# Patient Record
Sex: MALE | Race: WHITE | NOT HISPANIC OR LATINO | Employment: FULL TIME | URBAN - METROPOLITAN AREA
[De-identification: names, ages, dates, MRNs, and addresses within clinical notes are randomized per-mention and may not be internally consistent; named-entity substitution may affect disease eponyms.]

---

## 2017-08-18 ENCOUNTER — OFFICE VISIT (OUTPATIENT)
Dept: INTERNAL MEDICINE | Facility: CLINIC | Age: 57
End: 2017-08-18

## 2017-08-18 VITALS
HEART RATE: 86 BPM | DIASTOLIC BLOOD PRESSURE: 86 MMHG | SYSTOLIC BLOOD PRESSURE: 142 MMHG | BODY MASS INDEX: 23.39 KG/M2 | WEIGHT: 163 LBS | TEMPERATURE: 97.2 F | OXYGEN SATURATION: 98 %

## 2017-08-18 DIAGNOSIS — G89.29 HEEL PAIN, CHRONIC, RIGHT: ICD-10-CM

## 2017-08-18 DIAGNOSIS — M79.671 HEEL PAIN, CHRONIC, RIGHT: ICD-10-CM

## 2017-08-18 DIAGNOSIS — Z00.00 HEALTH CARE MAINTENANCE: Primary | ICD-10-CM

## 2017-08-18 DIAGNOSIS — M25.512 BILATERAL SHOULDER PAIN, UNSPECIFIED CHRONICITY: ICD-10-CM

## 2017-08-18 DIAGNOSIS — M25.511 BILATERAL SHOULDER PAIN, UNSPECIFIED CHRONICITY: ICD-10-CM

## 2017-08-18 PROCEDURE — 99213 OFFICE O/P EST LOW 20 MIN: CPT | Performed by: FAMILY MEDICINE

## 2017-08-18 PROCEDURE — 99396 PREV VISIT EST AGE 40-64: CPT | Performed by: FAMILY MEDICINE

## 2017-08-18 RX ORDER — TADALAFIL 20 MG
TABLET ORAL
Refills: 11 | COMMUNITY
Start: 2017-07-09

## 2017-08-18 NOTE — PROGRESS NOTES
Subjective   Leroy Vega is a 57 y.o. male.     Chief Complaint   Patient presents with   • Annual Exam         History of Present Illness patient is a very healthy 57-year-old gentleman who travels a lot internationally.  Last year he had dengue fever recovered.  He is here for wellness exam.  He has followed up with his urologist last week and is stable as far as history of prostate cancer.  10 months ago he did some work in [facility] and has wound up with recurrent bilateral shoulder pain.  He also has developed pain at the Achilles insertion of the right heel after stepping awkwardly on a rock in [place] a few months ago.  These have altered his ability to do the exercises he wishes to do on a chronic basis which is mostly aerobics and by cycling    The following portions of the patient's history were reviewed and updated as appropriate: allergies, current medications, past social history and problem list.    Review of Systems   Constitutional: Negative.    HENT: Negative.    Eyes: Negative.    Respiratory: Negative.    Cardiovascular: Negative.    Gastrointestinal: Negative.    Endocrine: Negative.    Genitourinary: Negative.    Musculoskeletal: Positive for arthralgias and neck stiffness.   Skin: Negative.    Allergic/Immunologic: Negative.    Neurological: Negative.    Hematological: Negative.    Psychiatric/Behavioral: Negative.        Objective   Vitals:    08/18/17 1122   BP: 142/86   Pulse: 86   Temp: 97.2 °F (36.2 °C)   SpO2: 98%     Physical Exam   Constitutional: He is oriented to person, place, and time. He appears well-developed and well-nourished.   HENT:   Head: Normocephalic and atraumatic.   Right Ear: Tympanic membrane and external ear normal.   Left Ear: Tympanic membrane and external ear normal.   Nose: Nose normal.   Mouth/Throat: Oropharynx is clear and moist.   Eyes: Conjunctivae and EOM are normal. Pupils are equal, round, and reactive to light.   Neck: Normal range of  motion. Neck supple. No JVD present. No thyromegaly present.   Cardiovascular: Normal rate, regular rhythm, normal heart sounds and intact distal pulses.    Pulmonary/Chest: Effort normal and breath sounds normal.           Abdominal: Soft. Bowel sounds are normal.   Musculoskeletal: Normal range of motion.        Arms:       Lymphadenopathy:     He has no cervical adenopathy.   Neurological: He is alert and oriented to person, place, and time. No cranial nerve deficit. Coordination normal.   Skin: Skin is warm and dry. No rash noted.   Psychiatric: He has a normal mood and affect. His behavior is normal. Judgment and thought content normal.   Vitals reviewed.      Assessment/Plan   Problem List Items Addressed This Visit     None      Visit Diagnoses     Health care maintenance    -  Primary    Heel pain, chronic, right        Relevant Orders    Ambulatory Referral to Sports Medicine    Bilateral shoulder pain, unspecified chronicity        Relevant Orders    Ambulatory Referral to Sports Medicine      Plan: He is brought in labs are as insurance would look very good.  His labs look very stable.  His viral for any additional medication.  Like symptoms sports medicine get checked out as far as his shoulders and the right at Achilles tendon insertion.  Otherwise recheck in here sooner if needed.

## 2017-08-28 ENCOUNTER — OFFICE VISIT (OUTPATIENT)
Dept: SPORTS MEDICINE | Facility: CLINIC | Age: 57
End: 2017-08-28

## 2017-08-28 VITALS
BODY MASS INDEX: 23.05 KG/M2 | SYSTOLIC BLOOD PRESSURE: 124 MMHG | OXYGEN SATURATION: 98 % | HEIGHT: 70 IN | WEIGHT: 161 LBS | DIASTOLIC BLOOD PRESSURE: 84 MMHG | HEART RATE: 85 BPM

## 2017-08-28 DIAGNOSIS — M79.672 PAIN OF LEFT HEEL: ICD-10-CM

## 2017-08-28 DIAGNOSIS — S49.92XA SHOULDER INJURY, LEFT, INITIAL ENCOUNTER: Primary | ICD-10-CM

## 2017-08-28 PROCEDURE — 99244 OFF/OP CNSLTJ NEW/EST MOD 40: CPT | Performed by: FAMILY MEDICINE

## 2017-08-28 PROCEDURE — 73030 X-RAY EXAM OF SHOULDER: CPT | Performed by: FAMILY MEDICINE

## 2017-08-28 PROCEDURE — 73630 X-RAY EXAM OF FOOT: CPT | Performed by: FAMILY MEDICINE

## 2017-08-28 NOTE — PROGRESS NOTES
"Leroy is a 57 y.o. year old male    Chief Complaint   Patient presents with   • Left Shoulder - Pain       History of Present Illness  1. Left shoulder pain since last October. 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  He saw a physician in December last year was diagnosed with a \"overuse\" of the left shoulder, patient has been \"babying it\" since that time but is still having pain that can be anterior or posterior with the posterior pain became more significant.  Pain is worse with certain movements of the shoulder such as rolling the shoulder or going from external/internal rotation of the shoulder.  He denies any weakness.  No neck pain.  No paresthesias.  2. L heel pain since the latter part of July, while climbing up sand dune he felt a sudden onset of sharp pain in the posterior left heel as he points to the Achilles attachment.  The next day he noted some pain and swelling over the plantar surface of the heel but he has not had any plantar surface heel pain.  The discomfort has gotten better with changing shoe gear adding more he'll lift, \"I want to be sure I'm okay to go back to running\".  Again he has no plantar surface heel pain, first few steps in the morning are fine.    I have reviewed the patient's medical history in detail and updated the computerized patient record.    Review of Systems   Constitutional: Negative for fever.   Musculoskeletal:        Per history of present illness   Skin: Negative for wound.   Neurological: Negative for numbness.   All other systems reviewed and are negative.      /84  Pulse 85  Ht 70\" (177.8 cm)  Wt 161 lb (73 kg)  SpO2 98%  BMI 23.1 kg/m2     Physical Exam    Vital signs reviewed.   General: No acute distress.  Eyes: conjunctiva clear; pupils equally round and " reactive  ENT: external ears and nose atraumatic; oropharynx clear  CV: no peripheral edema, 2+ distal pulses  Resp: normal respiratory effort, no use of accessory muscles  Skin: no rashes or wounds; normal turgor  Psych: mood and affect appropriate; recent and remote memory intact  Neuro: sensation to light touch intact    MSK Exam:  1.  Left shoulder general appearance is normal.  Patient has full painless range of motion.  Rotator cuff motor 5 out of 5.  Negative Bernardo, equivocal Neer testing but the pain is posterior.  Mild sulcus, normal anterior-posterior glide.  Positive [place] with pain being posteriorly.  2.  Left heel normal in general appearance, there is a tight heel cord.  No tenderness to palpation along the Achilles or at its attachment.  Normal Achilles response to calf squeeze.  No pain with resisted range of motion.  Brief ultrasound exam reveals a normal-appearing Achilles in particular at the attachment.    Left Shoulder X-Ray  Indication: Pain  Views: AP Internal and External Rotation and Axillary    Findings:  No fracture  No bony lesion  Normal soft tissues  Mild changes of arthritis at the acromioclavicular joint.    No prior studies were available for comparison.  Left Foot X-Ray  Indication: Pain  AP, Lateral, and Oblique views    Findings:  No fracture  No bony lesion  Normal soft tissues  Mild arthritic changes at the first MTP.    No prior studies were available for comparison.    Diagnoses and all orders for this visit:    Shoulder injury, left, initial encounter  -     XR Shoulder 2+ View Left  -     Ambulatory Referral to Physical Therapy Evaluate and treat    Pain of left heel  -     XR Foot 3+ View Left  1.  Patient is possible the patient actually may have a posterior labral tear in this left shoulder.  We'll start with some formal physical therapy but if he is not seeing significant improvement over the next 4-5 weeks and would prefer to have MR arthrogram at that time.  2.   Left heel pain, essentially normal exam today, I do wonder if he may have had a small tear of the Achilles attachment on the lateral side at the time of his injury.  He is seen significant improvement since that time and for now we will recommend that he hold off on running for another 2-3 weeks, had a few millimeters of he'll lift, then start stretches and he may start attempting running at that time.  If he cannot get back to running by that time then return the office.    EMR Dragon/Transcription disclaimer:    Much of this encounter note is an electronic transcription/translation of spoken language to printed text.  The electronic translation of spoken language may permit erroneous, or at times, nonsensical words or phrases to be inadvertently transcribed.  Although I have reviewed the note for such errors some may still exist.

## 2017-09-18 ENCOUNTER — TREATMENT (OUTPATIENT)
Dept: PHYSICAL THERAPY | Facility: CLINIC | Age: 57
End: 2017-09-18

## 2017-09-18 DIAGNOSIS — S49.92XD SHOULDER INJURY, LEFT, SUBSEQUENT ENCOUNTER: Primary | ICD-10-CM

## 2017-09-18 PROCEDURE — 97035 APP MDLTY 1+ULTRASOUND EA 15: CPT | Performed by: PHYSICAL THERAPIST

## 2017-09-18 PROCEDURE — 97161 PT EVAL LOW COMPLEX 20 MIN: CPT | Performed by: PHYSICAL THERAPIST

## 2017-09-18 PROCEDURE — A9999 DME SUPPLY OR ACCESSORY, NOS: HCPCS | Performed by: PHYSICAL THERAPIST

## 2017-09-18 PROCEDURE — 97110 THERAPEUTIC EXERCISES: CPT | Performed by: PHYSICAL THERAPIST

## 2017-09-18 NOTE — PATIENT INSTRUCTIONS
Access Code: [email or web address]   URL: [email or web address]   Date: 09/18/2017   Prepared by: Barbara Olson     Exercises  Prone Lower Trapezius Strengthening on Swiss Ball - 10 reps - 2 sets - 5 hold - 1x daily  Prone Middle Trapezius Strengthening on Swiss Ball - 10 reps - 2 sets - 5 hold - 1x daily  Standing Wall Ball Circles in Scaption with Mini Swiss Ball - 30 reps - 2 sets - 1x daily  Standing Plank on Wall with Reaches and Resistance - 5 reps - 1 sets - 3 hold - 1x daily  Shoulder External Rotation and Scapular Retraction with Resistance - 15 reps - 1 sets - 5 hold - 1x daily  Push-Up on Counter - 20 reps - 1 sets - 3 hold - 1x daily    Issued red and yellow TB for HEP
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
## 2017-09-25 ENCOUNTER — TREATMENT (OUTPATIENT)
Dept: PHYSICAL THERAPY | Facility: CLINIC | Age: 57
End: 2017-09-25

## 2017-09-25 DIAGNOSIS — S49.92XD SHOULDER INJURY, LEFT, SUBSEQUENT ENCOUNTER: Primary | ICD-10-CM

## 2017-09-25 PROCEDURE — 97035 APP MDLTY 1+ULTRASOUND EA 15: CPT | Performed by: PHYSICAL THERAPIST

## 2017-09-25 PROCEDURE — 97110 THERAPEUTIC EXERCISES: CPT | Performed by: PHYSICAL THERAPIST

## 2017-09-25 PROCEDURE — 97112 NEUROMUSCULAR REEDUCATION: CPT | Performed by: PHYSICAL THERAPIST

## 2017-09-25 NOTE — PROGRESS NOTES
Physical Therapy Daily Progress Note    Visit # : 2  Leroy Vega reports: feeling about the same; noted pain raking the leaves over the weekend.  Cycled over the weekend which didn't seem to bother my shoulder.      Subjective     Objective   See Exercise, Manual, and Modality Logs for complete treatment.       Assessment/Plan  Pt noting tightness in L shoulder with SB push up.  Pt instructed to decrease ROM with TB exercise to allow for pain free reps.  Good tolerance to exercise progression.     Progress per Plan of Care           Manual Therapy:    3     mins  41534;  Therapeutic Exercise:    15     mins  85970;     Neuromuscular Vasquez:    15    mins  67494;    Therapeutic Activity:     -     mins  96791;     Gait Training:      -     mins  85795;     Ultrasound:     8     mins  10101;    Electrical Stimulation:    -     mins  97628 ( );  Dry Needling     -     mins self-pay      Timed Treatment:   38   mins   Total Treatment:    41    mins        Colleen Olson PT  Physical Therapist  [place] License # [id number]

## 2017-09-25 NOTE — PATIENT INSTRUCTIONS
Access Code: [email or web address]   URL: [email or web address]   Date: 09/25/2017   Prepared by: Barbara Olson     Exercises  Prone Lower Trapezius Strengthening on Swiss Ball - 10 reps - 2 sets - 5 hold - 1x daily  Prone Middle Trapezius Strengthening on Swiss Ball - 10 reps - 2 sets - 5 hold - 1x daily  Standing Wall Ball Circles in Scaption with Mini Swiss Ball - 30 reps - 2 sets - 1x daily  Standing Plank on Wall with Reaches and Resistance - 5 reps - 1 sets - 3 hold - 1x daily  Shoulder External Rotation and Scapular Retraction with Resistance - 15 reps - 1 sets - 5 hold - 1x daily  Push-Up on Counter - 20 reps - 1 sets - 3 hold - 1x daily  Shoulder External Rotation Reactive Isometrics - 10 reps - 1 sets - 3 hold - 1x daily

## 2017-10-02 ENCOUNTER — TREATMENT (OUTPATIENT)
Dept: PHYSICAL THERAPY | Facility: CLINIC | Age: 57
End: 2017-10-02

## 2017-10-02 DIAGNOSIS — S49.92XD SHOULDER INJURY, LEFT, SUBSEQUENT ENCOUNTER: Primary | ICD-10-CM

## 2017-10-02 PROCEDURE — 97112 NEUROMUSCULAR REEDUCATION: CPT | Performed by: PHYSICAL THERAPIST

## 2017-10-02 PROCEDURE — 97110 THERAPEUTIC EXERCISES: CPT | Performed by: PHYSICAL THERAPIST

## 2017-10-02 NOTE — PROGRESS NOTES
Physical Therapy Daily Progress Note    Visit # : 3  Leroy Vega reports: shoulder feels tight; certain movements remain painful.  Overall, ~ 20% improvement in symptoms.      Subjective     Objective     Tenderness     Left Shoulder   No tenderness in the infraspinatus tendon, subacromial bursa and supraspinatus tendon.     Tests     Left Shoulder   Positive active compression ([place]).      See Exercise, Manual, and Modality Logs for complete treatment.       Assessment/Plan  Pt noting pain with retro UBE and overall tightening in shoulder with body blade pro.  Pt may require further medical testing to r/o labral involvement.  Pt instructed to contact MD and will hold further PT at this time.  Awaiting MD orders           Manual Therapy:    3     mins  79663;  Therapeutic Exercise:    15     mins  85126;     Neuromuscular Vasquez:    15    mins  83247;    Therapeutic Activity:     -     mins  25710;     Gait Training:      -     mins  78709;     Ultrasound:     -     mins  03818;    Electrical Stimulation:    -     mins  04697 ( );  Dry Needling     -     mins self-pay      Timed Treatment:   33   mins   Total Treatment:     45   mins        Colleen Olson PT  Physical Therapist  [place] License # [id number]

## 2017-10-03 ENCOUNTER — TELEPHONE (OUTPATIENT)
Dept: SPORTS MEDICINE | Facility: CLINIC | Age: 57
End: 2017-10-03

## 2017-10-03 DIAGNOSIS — M24.812 INTERNAL DERANGEMENT OF SHOULDER, LEFT: Primary | ICD-10-CM

## 2017-10-03 NOTE — TELEPHONE ENCOUNTER
Patient is calling requesting MRI left shoulder. He has finished therapy and thinks that it may be a good idea to get the MRI because therapy hasn't really helped with the shoulder. Can you input MRI for him please.

## 2017-10-13 DIAGNOSIS — S43.432A LABRAL TEAR OF SHOULDER, LEFT, INITIAL ENCOUNTER: Primary | ICD-10-CM

## 2017-11-06 ENCOUNTER — DOCUMENTATION (OUTPATIENT)
Dept: PHYSICAL THERAPY | Facility: CLINIC | Age: 57
End: 2017-11-06

## 2017-11-06 NOTE — PROGRESS NOTES
Discharge Summary  Discharge Summary from Physical Therapy Report      Dates  PT visit: 9/18-10/2/17  Number of Visits: 3     Discharge Status of Patient: See progress note dated 10/2/17    Goals: Partially Met    Discharge Plan: Patient to return to referring/providing physician    Comments pt may require further medical testing to r/o labral involvement    Date of Discharge 11/6/17        Colleen lOson, PT  Physical Therapist

## 2017-11-30 ENCOUNTER — OFFICE VISIT (OUTPATIENT)
Dept: ORTHOPEDIC SURGERY | Facility: CLINIC | Age: 57
End: 2017-11-30

## 2017-11-30 VITALS
SYSTOLIC BLOOD PRESSURE: 114 MMHG | BODY MASS INDEX: 23.05 KG/M2 | HEIGHT: 70 IN | DIASTOLIC BLOOD PRESSURE: 76 MMHG | WEIGHT: 161 LBS | HEART RATE: 59 BPM

## 2017-11-30 DIAGNOSIS — M75.42 SUBACROMIAL IMPINGEMENT OF LEFT SHOULDER: ICD-10-CM

## 2017-11-30 DIAGNOSIS — S43.432A SUPERIOR GLENOID LABRUM LESION OF LEFT SHOULDER, INITIAL ENCOUNTER: Primary | ICD-10-CM

## 2017-11-30 PROCEDURE — 20610 DRAIN/INJ JOINT/BURSA W/O US: CPT | Performed by: ORTHOPAEDIC SURGERY

## 2017-11-30 PROCEDURE — 99204 OFFICE O/P NEW MOD 45 MIN: CPT | Performed by: ORTHOPAEDIC SURGERY

## 2017-11-30 RX ORDER — NAPROXEN SODIUM 220 MG
220 TABLET ORAL 2 TIMES DAILY PRN
COMMUNITY

## 2017-11-30 RX ORDER — PSEUDOEPHEDRINE HCL 30 MG
30 TABLET ORAL EVERY 4 HOURS PRN
COMMUNITY

## 2017-11-30 RX ADMIN — TRIAMCINOLONE ACETONIDE 80 MG: 40 INJECTION, SUSPENSION INTRA-ARTICULAR; INTRAMUSCULAR at 16:02

## 2017-11-30 RX ADMIN — LIDOCAINE HYDROCHLORIDE 4 ML: 10 INJECTION, SOLUTION EPIDURAL; INFILTRATION; INTRACAUDAL; PERINEURAL at 16:02

## 2017-12-04 PROBLEM — M75.42 SUBACROMIAL IMPINGEMENT OF LEFT SHOULDER: Status: ACTIVE | Noted: 2017-12-04

## 2017-12-04 PROBLEM — S43.432A SUPERIOR GLENOID LABRUM LESION OF LEFT SHOULDER: Status: ACTIVE | Noted: 2017-12-04

## 2017-12-04 RX ORDER — TRIAMCINOLONE ACETONIDE 40 MG/ML
80 INJECTION, SUSPENSION INTRA-ARTICULAR; INTRAMUSCULAR
Status: COMPLETED | OUTPATIENT
Start: 2017-11-30 | End: 2017-11-30

## 2017-12-04 RX ORDER — LIDOCAINE HYDROCHLORIDE 10 MG/ML
4 INJECTION, SOLUTION EPIDURAL; INFILTRATION; INTRACAUDAL; PERINEURAL
Status: COMPLETED | OUTPATIENT
Start: 2017-11-30 | End: 2017-11-30

## 2018-01-04 ENCOUNTER — OFFICE VISIT (OUTPATIENT)
Dept: ORTHOPEDIC SURGERY | Facility: CLINIC | Age: 58
End: 2018-01-04

## 2018-01-04 DIAGNOSIS — S43.432D SUPERIOR GLENOID LABRUM LESION OF LEFT SHOULDER, SUBSEQUENT ENCOUNTER: Primary | ICD-10-CM

## 2018-01-04 DIAGNOSIS — M75.42 SUBACROMIAL IMPINGEMENT OF LEFT SHOULDER: ICD-10-CM

## 2018-01-04 PROCEDURE — 99212 OFFICE O/P EST SF 10 MIN: CPT | Performed by: ORTHOPAEDIC SURGERY

## 2018-01-04 NOTE — PROGRESS NOTES
Subjective:     Patient ID: Leroy Vega is a 57 y.o. male.    Chief Complaint:  Follow-up left shoulder pain, SLAP lesion  History of Present Illness  Leroy Vega returns to clinic today for evaluation of left shoulder, states he is doing significantly better following subacromial injection with improvement activity levels and swimming, golfing, and lifting and pushing activities with the left shoulder.  Notes mild residual irritation with repetitive activities including bench press with significant shoulder extension, rates pain with these activities as a 3-4 out of 10, sharp in nature, localized to the posterior shoulder primarily, moderate improvement with rest and anti-inflammatory medications.  Notes radiation of pain, denies associated numbness or tingling.  Does still notes some occasional clicking and catching over his left shoulder.     Social History     Occupational History   • Not on file.     Social History Main Topics   • Smoking status: Never Smoker   • Smokeless tobacco: Not on file   • Alcohol use Yes      Comment: occ   • Drug use: Not on file   • Sexual activity: Not on file      No past medical history on file.  Past Surgical History:   Procedure Laterality Date   • COLONOSCOPY  05/30/2013   • KNEE SURGERY     • PROSTATE SURGERY         Family History   Problem Relation Age of Onset   • Cancer Mother    • Cancer Father          Review of Systems   Constitutional: Negative for chills, diaphoresis, fever and unexpected weight change.   HENT: Negative for hearing loss, nosebleeds, sore throat and tinnitus.    Eyes: Negative for pain and visual disturbance.   Respiratory: Negative for cough, shortness of breath and wheezing.    Cardiovascular: Negative for chest pain and palpitations.   Gastrointestinal: Negative for abdominal pain, diarrhea, nausea and vomiting.   Endocrine: Negative for cold intolerance, heat intolerance and polydipsia.   Genitourinary: Negative for difficulty urinating,  dysuria and hematuria.   Musculoskeletal: Negative for arthralgias, joint swelling and myalgias.   Skin: Negative for rash and wound.   Allergic/Immunologic: Negative for environmental allergies.   Neurological: Negative for dizziness, syncope and numbness.   Hematological: Does not bruise/bleed easily.   Psychiatric/Behavioral: Negative for dysphoric mood and sleep disturbance. The patient is not nervous/anxious.            Objective:  There were no vitals filed for this visit.  There were no vitals filed for this visit.  There is no height or weight on file to calculate BMI.  General: No acute distress.  Resp: normal respiratory effort  Skin: no rashes or wounds; normal turgor  Psych: mood and affect appropriate; recent and remote memory intact         Ortho Exam    Left shoulder-active forward flexion 175°, external rotation 60°, internal rotation T12, 5 out of 5 strength in all planes of motion.  Negative Bernardo and Neer's, negative drop arm test, negative empty can test, mild tenderness on [place]'s testing, minimal tenderness with Yergason and speed's, no tenderness on crossarm testing or over the acromioclavicular joint.  Mildly positive jerk test.  No posterior laxity noted.    Imaging:    Assessment:       1. Superior glenoid labrum lesion of left shoulder, subsequent encounter    2. Subacromial impingement of left shoulder          Plan:  ASHLEY query complete.          Discussed treatment options at length with patient at today's visit. Patient is very happy with the response from subacromial injection at this time, feels that he has been able to return to most of his activities with minimal associated difficulty.  He does have recurrence of pain we may discuss other options including not limited to labral debridement and biceps tenodesis versus SLAP repair.     Leroy Vega was in agreement with plan and had all questions answered.     Orders:  No orders of the defined types were placed in this  encounter.      Medications:  No orders of the defined types were placed in this encounter.      Followup:  Return if symptoms worsen or fail to improve.    Leroy was seen today for follow-up.    Diagnoses and all orders for this visit:    Superior glenoid labrum lesion of left shoulder, subsequent encounter    Subacromial impingement of left shoulder        Dragon transcription disclaimer     Much of this encounter note is an electronic transcription/translation of spoken language to printed text. The electronic translation of spoken language may permit erroneous, or at times, nonsensical words or phrases to be inadvertently transcribed. Although I have reviewed the note for such errors, some may still exist.

## 2018-07-16 ENCOUNTER — TELEPHONE (OUTPATIENT)
Dept: SPORTS MEDICINE | Facility: CLINIC | Age: 58
End: 2018-07-16

## 2018-08-10 ENCOUNTER — RESULTS ENCOUNTER (OUTPATIENT)
Dept: INTERNAL MEDICINE | Facility: CLINIC | Age: 58
End: 2018-08-10

## 2018-08-10 DIAGNOSIS — G89.29 HEEL PAIN, CHRONIC, RIGHT: ICD-10-CM

## 2018-08-10 DIAGNOSIS — Z00.00 HEALTH CARE MAINTENANCE: ICD-10-CM

## 2018-08-10 DIAGNOSIS — M79.671 HEEL PAIN, CHRONIC, RIGHT: ICD-10-CM

## 2018-08-10 DIAGNOSIS — M25.512 BILATERAL SHOULDER PAIN, UNSPECIFIED CHRONICITY: ICD-10-CM

## 2018-08-10 DIAGNOSIS — M25.511 BILATERAL SHOULDER PAIN, UNSPECIFIED CHRONICITY: ICD-10-CM

## 2018-08-20 ENCOUNTER — OFFICE VISIT (OUTPATIENT)
Dept: INTERNAL MEDICINE | Facility: CLINIC | Age: 58
End: 2018-08-20

## 2018-08-20 VITALS
SYSTOLIC BLOOD PRESSURE: 108 MMHG | BODY MASS INDEX: 22.53 KG/M2 | DIASTOLIC BLOOD PRESSURE: 82 MMHG | OXYGEN SATURATION: 98 % | TEMPERATURE: 97.1 F | WEIGHT: 157 LBS | HEART RATE: 77 BPM

## 2018-08-20 DIAGNOSIS — Z00.00 ENCOUNTER FOR WELLNESS EXAMINATION IN ADULT: ICD-10-CM

## 2018-08-20 DIAGNOSIS — M47.812 FACET ARTHROPATHY, CERVICAL: ICD-10-CM

## 2018-08-20 DIAGNOSIS — E78.2 MIXED HYPERLIPIDEMIA: Primary | ICD-10-CM

## 2018-08-20 DIAGNOSIS — R00.2 PALPITATIONS: ICD-10-CM

## 2018-08-20 PROCEDURE — 99396 PREV VISIT EST AGE 40-64: CPT | Performed by: FAMILY MEDICINE

## 2018-08-20 NOTE — PROGRESS NOTES
Subjective   Leroy Vega is a 58 y.o. male.     Chief Complaint   Patient presents with   • Hyperlipidemia   • Annual Exam         History of Present Illness   Very robust gentleman who exercises frequently is here for a general physical.  Return to get some screening labs.  Otherwise he follows up with urology with a history of prostatectomy.  He has frequent foreign travel but has had no symptoms of anything in [place] where he travels.  He has had some increasing stiffness of the cervical spine without numbness or symptoms down the arms.      The following portions of the patient's history were reviewed and updated as appropriate: allergies, current medications, past social history and problem list.    Review of Systems   Constitutional: Negative.    HENT: Negative.    Eyes: Negative.    Respiratory: Negative.    Cardiovascular: Negative.    Endocrine: Negative.    Genitourinary: Negative.    Musculoskeletal: Positive for arthralgias.   Skin: Negative.    Neurological: Negative.    Hematological: Negative.    Psychiatric/Behavioral: Negative.        Objective   Vitals:    08/20/18 1124   BP: 108/82   Pulse: 77   Temp: 97.1 °F (36.2 °C)   SpO2: 98%     Physical Exam   Constitutional: He is oriented to person, place, and time. He appears well-developed.   HENT:   Head: Normocephalic.   Right Ear: External ear normal.   Left Ear: External ear normal.   Mouth/Throat: Oropharynx is clear and moist.   Eyes: Pupils are equal, round, and reactive to light.   Neck: Neck supple. Decreased range of motion present.   Cardiovascular: Normal rate, regular rhythm and normal heart sounds.    Pulmonary/Chest: Effort normal and breath sounds normal.   Abdominal: Soft. Bowel sounds are normal.   Neurological: He is alert and oriented to person, place, and time.   Skin: Skin is warm and dry.   Psychiatric: He has a normal mood and affect.   Vitals reviewed.      Assessment/Plan   Problem List Items Addressed This Visit         Cardiovascular and Mediastinum    HLD (hyperlipidemia) - Primary    Relevant Orders    CBC & Differential    Comprehensive Metabolic Panel    Lipid Panel With / Chol / HDL Ratio    TSH    T4, Free      Other Visit Diagnoses     Encounter for wellness examination in adult        Relevant Orders    CBC & Differential    Comprehensive Metabolic Panel    Lipid Panel With / Chol / HDL Ratio    TSH    T4, Free    Palpitations        Relevant Orders    CBC & Differential    Comprehensive Metabolic Panel    Lipid Panel With / Chol / HDL Ratio    TSH    T4, Free    Facet arthropathy, cervical          Plan: Basic screening labs follow-up with urology recheck in a year discussion about facet arthropathy and implications as far as referral.

## 2018-08-21 LAB
ALBUMIN SERPL-MCNC: 4.8 G/DL (ref 3.5–5.5)
ALBUMIN/GLOB SERPL: 2.1 {RATIO} (ref 1.2–2.2)
ALP SERPL-CCNC: 72 IU/L (ref 39–117)
ALT SERPL-CCNC: 18 IU/L (ref 0–44)
AST SERPL-CCNC: 26 IU/L (ref 0–40)
BASOPHILS # BLD AUTO: 0 X10E3/UL (ref 0–0.2)
BASOPHILS NFR BLD AUTO: 0 %
BILIRUB SERPL-MCNC: 0.8 MG/DL (ref 0–1.2)
BUN SERPL-MCNC: 15 MG/DL (ref 6–24)
BUN/CREAT SERPL: 16 (ref 9–20)
CALCIUM SERPL-MCNC: 9.4 MG/DL (ref 8.7–10.2)
CHLORIDE SERPL-SCNC: 101 MMOL/L (ref 96–106)
CHOLEST SERPL-MCNC: 204 MG/DL (ref 100–199)
CHOLEST/HDLC SERPL: 3.2 RATIO (ref 0–5)
CO2 SERPL-SCNC: 23 MMOL/L (ref 20–29)
CREAT SERPL-MCNC: 0.94 MG/DL (ref 0.76–1.27)
EOSINOPHIL # BLD AUTO: 0.1 X10E3/UL (ref 0–0.4)
EOSINOPHIL NFR BLD AUTO: 2 %
ERYTHROCYTE [DISTWIDTH] IN BLOOD BY AUTOMATED COUNT: 14 % (ref 12.3–15.4)
GLOBULIN SER CALC-MCNC: 2.3 G/DL (ref 1.5–4.5)
GLUCOSE SERPL-MCNC: 98 MG/DL (ref 65–99)
HCT VFR BLD AUTO: 44.2 % (ref 37.5–51)
HDLC SERPL-MCNC: 64 MG/DL
HGB BLD-MCNC: 15.2 G/DL (ref 13–17.7)
IMM GRANULOCYTES # BLD: 0 X10E3/UL (ref 0–0.1)
IMM GRANULOCYTES NFR BLD: 0 %
LDLC SERPL CALC-MCNC: 128 MG/DL (ref 0–99)
LYMPHOCYTES # BLD AUTO: 0.8 X10E3/UL (ref 0.7–3.1)
LYMPHOCYTES NFR BLD AUTO: 15 %
MCH RBC QN AUTO: 30.6 PG (ref 26.6–33)
MCHC RBC AUTO-ENTMCNC: 34.4 G/DL (ref 31.5–35.7)
MCV RBC AUTO: 89 FL (ref 79–97)
MONOCYTES # BLD AUTO: 0.4 X10E3/UL (ref 0.1–0.9)
MONOCYTES NFR BLD AUTO: 8 %
NEUTROPHILS # BLD AUTO: 4 X10E3/UL (ref 1.4–7)
NEUTROPHILS NFR BLD AUTO: 75 %
PLATELET # BLD AUTO: 264 X10E3/UL (ref 150–379)
POTASSIUM SERPL-SCNC: 4.4 MMOL/L (ref 3.5–5.2)
PROT SERPL-MCNC: 7.1 G/DL (ref 6–8.5)
RBC # BLD AUTO: 4.96 X10E6/UL (ref 4.14–5.8)
SODIUM SERPL-SCNC: 140 MMOL/L (ref 134–144)
T4 FREE SERPL-MCNC: 1.41 NG/DL (ref 0.82–1.77)
TRIGL SERPL-MCNC: 61 MG/DL (ref 0–149)
TSH SERPL DL<=0.005 MIU/L-ACNC: 0.93 UIU/ML (ref 0.45–4.5)
VLDLC SERPL CALC-MCNC: 12 MG/DL (ref 5–40)
WBC # BLD AUTO: 5.4 X10E3/UL (ref 3.4–10.8)

## 2019-05-30 ENCOUNTER — OFFICE VISIT (OUTPATIENT)
Dept: ORTHOPEDIC SURGERY | Facility: CLINIC | Age: 59
End: 2019-05-30

## 2019-05-30 VITALS — HEIGHT: 70 IN | BODY MASS INDEX: 22.48 KG/M2 | WEIGHT: 157 LBS

## 2019-05-30 DIAGNOSIS — M75.42 SUBACROMIAL IMPINGEMENT OF LEFT SHOULDER: ICD-10-CM

## 2019-05-30 DIAGNOSIS — S43.432D SUPERIOR GLENOID LABRUM LESION OF LEFT SHOULDER, SUBSEQUENT ENCOUNTER: Primary | ICD-10-CM

## 2019-05-30 PROCEDURE — 73030 X-RAY EXAM OF SHOULDER: CPT | Performed by: ORTHOPAEDIC SURGERY

## 2019-05-30 PROCEDURE — 99214 OFFICE O/P EST MOD 30 MIN: CPT | Performed by: ORTHOPAEDIC SURGERY

## 2019-05-30 NOTE — PROGRESS NOTES
Subjective:     Patient ID: Leroy Vega is a 58 y.o. male.    Chief Complaint: Follow-up left shoulder pain, SLAP lesion    History of Present Illness  Leroy Vega returns to clinic today for evaluation of left shoulder. He states that his pain was doing well, and he was swimming and running without complaints prior to a pain flare 5-6 months ago. He notes that at that time, he was shoveling his driveway after a snow storm, when he suddenly experienced pain in the left shoulder. He reports significant pain in the left shoulder three days later when he was putting a shirt on, and he states the pain in his shoulder has been ongoing since that time. He has tried at-home exercises for the shoulder pain, but was unable to perform the exercises secondary to the pain. He also notes that he has had to modify swimming, golfing, and strength training activities due to the pain. He reports that his pain is worsened with most pushing or pulling exercises, overhead motions or lifting, or shoulder extension. He also states that he is unable to perform arm curls with more than 35 pounds secondary to the pain. The pain is relieved with rest. He notes the pain at this time is mostly concentrated over the anterolateral aspect of the shoulder, and he denies any radiation of the pain beyond the elbow. He denies any numbness or tingling in the left upper extremity.  He rates the pain as a 5-7 out of 10 and occasionally sharp in nature with moderate aching    Social History     Occupational History   • Not on file   Tobacco Use   • Smoking status: Never Smoker   Substance and Sexual Activity   • Alcohol use: Yes     Comment: occ   • Drug use: Not on file   • Sexual activity: Not on file      No past medical history on file.  Past Surgical History:   Procedure Laterality Date   • COLONOSCOPY  05/30/2013   • KNEE SURGERY     • PROSTATE SURGERY         Family History   Problem Relation Age of Onset   • Cancer Mother    • Cancer  "Father          Review of Systems   Constitutional: Negative for chills, diaphoresis, fever and unexpected weight change.   HENT: Negative for hearing loss, nosebleeds, sore throat and tinnitus.    Eyes: Negative for pain and visual disturbance.   Respiratory: Negative for cough, shortness of breath and wheezing.    Cardiovascular: Negative for chest pain and palpitations.   Gastrointestinal: Negative for abdominal pain, diarrhea, nausea and vomiting.   Endocrine: Negative for cold intolerance, heat intolerance and polydipsia.   Genitourinary: Negative for difficulty urinating, dysuria and hematuria.   Musculoskeletal: Positive for arthralgias. Negative for joint swelling and myalgias.   Skin: Negative for rash and wound.   Allergic/Immunologic: Negative for environmental allergies.   Neurological: Negative for dizziness, syncope and numbness.   Hematological: Does not bruise/bleed easily.   Psychiatric/Behavioral: Negative for dysphoric mood and sleep disturbance. The patient is not nervous/anxious.            Objective:  Vitals:    05/30/19 1346   Weight: 71.2 kg (157 lb)   Height: 177.8 cm (70\")         05/30/19  1346   Weight: 71.2 kg (157 lb)     Body mass index is 22.53 kg/m².    General: No acute distress.  Resp: normal respiratory effort  Skin: no rashes or wounds; normal turgor  Psych: mood and affect appropriate; recent and remote memory intact      Ortho Exam     Left shoulder - FF to 180, ER to 55, 4/5 strength on ER, 4+/5 strength on FF with minimally positive empty can test, minimal tenderness over the AC joint with negative cross arm test, significantly positive Farwell's test, negative bear hug test. Negative drop arm test, negative external rotation lag sign    Imaging:  Left Shoulder X-Ray  Indication: Pain  AP, scapular Y, and axillary lateral views    Findings:  No fracture  No bony lesion  Normal soft tissues  Moderate degenerative change AC joint with subchondral cyst distal clavicle, moderate " humeral head elevation    Prior studies were available for comparison.     Assessment:        1. Superior glenoid labrum lesion of left shoulder, subsequent encounter    2. Subacromial impingement of left shoulder           Plan:          Discussed treatment options at length with patient at today's visit.  Given significant recurrence of pain despite prior conservative treatment options, he was to proceed with surgical treatment.  He would like to delay surgery until the end of September but given his failure with therapy, anti-inflammatory medications, and prior failure with cortisone injections, he would like to hold off on any other conservative treatment at this time except for home exercises which she will continue to do on his own.    Plan will be for left shoulder arthroscopy, rotator cuff debridement versus repair, possible biceps tenodesis, subacromial decompression, and all associated procedures.  I reviewed risks benefits and alternatives the procedure with risks including not limited to neurovascular damage, bleeding, infection, chronic pain, re-tear rotator cuff, failure of healing rotator cuff, loss of motion, weakness, stiffness, instability, biceps sag, DVT, pulmonary embolus, death, stroke, complex regional pain syndrome, myocardial infarction, need for additional procedures. He understood all these had all questions answered.  Patient verbally consented to proceed with surgery.  No guarantees were given regarding results of surgery.  We will have patient medically optimized by primary care physician and proceed with surgery at next available date.    He denies any significant cardiac history or history of prior DVT/PE    Leroy Vega was in agreement with plan and had all questions answered.     Orders:  Orders Placed This Encounter   Procedures   • XR Shoulder 2+ View Left   • Basic metabolic panel   • Protime-INR   • APTT   • NPO After Midnight   • Follow anesthesia standing orders.   •  Provide instructions to patient regarding NPO status   • Clorhexidine skin prep   • CBC and Differential       Medications:  No orders of the defined types were placed in this encounter.      Followup:  No Follow-up on file.    Leroy was seen today for follow-up.    Diagnoses and all orders for this visit:    Superior glenoid labrum lesion of left shoulder, subsequent encounter  -     XR Shoulder 2+ View Left  -     Case Request; Standing  -     CBC and Differential; Future  -     Basic metabolic panel; Future  -     Protime-INR; Future  -     APTT; Future  -     acetaminophen (TYLENOL) tablet 975 mg  -     meloxicam (MOBIC) tablet 15 mg  -     pregabalin (LYRICA) capsule 150 mg  -     ceFAZolin (ANCEF) 2 g in sodium chloride 0.9 % 100 mL IVPB  -     Case Request    Subacromial impingement of left shoulder  -     Case Request; Standing  -     CBC and Differential; Future  -     Basic metabolic panel; Future  -     Protime-INR; Future  -     APTT; Future  -     acetaminophen (TYLENOL) tablet 975 mg  -     meloxicam (MOBIC) tablet 15 mg  -     pregabalin (LYRICA) capsule 150 mg  -     ceFAZolin (ANCEF) 2 g in sodium chloride 0.9 % 100 mL IVPB  -     Case Request    Other orders  -     Follow anesthesia standing orders.  -     Provide instructions to patient regarding NPO status  -     Clorhexidine skin prep  -     Follow Anesthesia Guidelines / Standing Orders; Standing  -     Verify NPO Status; Standing  -     Clip operative site; Standing  -     Obtain informed consent (if not collected inpatient or PAT); Standing  -     NPO After Midnight    By signing my name here, I Eric Echeverria MD, attest that all documentation on 05/31/19 at 8:14 AM has been prepared under the direction and in the presence of Dr. Eric Echeverria.    I, Dr. Eric Echeverria, personally performed the services described in this documentation, as scribed by Eric Echeverria MD, in my presence, and it is both accurate and complete.        Dictated utilizing Dragon dictation

## 2019-05-31 RX ORDER — MELOXICAM 7.5 MG/1
15 TABLET ORAL ONCE
Status: CANCELLED | OUTPATIENT
Start: 2019-05-31 | End: 2019-05-31

## 2019-05-31 RX ORDER — ACETAMINOPHEN 325 MG/1
1000 TABLET ORAL ONCE
Status: CANCELLED | OUTPATIENT
Start: 2019-05-31 | End: 2019-05-31

## 2019-05-31 RX ORDER — PREGABALIN 150 MG/1
150 CAPSULE ORAL ONCE
Status: CANCELLED | OUTPATIENT
Start: 2019-05-31 | End: 2019-05-31

## 2019-06-05 ENCOUNTER — RESULTS ENCOUNTER (OUTPATIENT)
Dept: ORTHOPEDIC SURGERY | Facility: CLINIC | Age: 59
End: 2019-06-05

## 2019-06-05 DIAGNOSIS — M75.42 SUBACROMIAL IMPINGEMENT OF LEFT SHOULDER: ICD-10-CM

## 2019-06-05 DIAGNOSIS — S43.432D SUPERIOR GLENOID LABRUM LESION OF LEFT SHOULDER, SUBSEQUENT ENCOUNTER: ICD-10-CM

## 2019-09-20 ENCOUNTER — RESULTS ENCOUNTER (OUTPATIENT)
Dept: INTERNAL MEDICINE | Facility: CLINIC | Age: 59
End: 2019-09-20

## 2019-09-20 DIAGNOSIS — E78.2 MIXED HYPERLIPIDEMIA: ICD-10-CM

## 2019-09-20 DIAGNOSIS — C61 PROSTATE CANCER (HCC): ICD-10-CM

## 2019-09-20 DIAGNOSIS — E78.2 MIXED HYPERLIPIDEMIA: Primary | ICD-10-CM

## 2019-09-24 LAB
ALBUMIN SERPL-MCNC: 4.5 G/DL (ref 3.5–5.2)
ALBUMIN/GLOB SERPL: 1.7 G/DL
ALP SERPL-CCNC: 77 U/L (ref 39–117)
ALT SERPL-CCNC: 17 U/L (ref 1–41)
AST SERPL-CCNC: 29 U/L (ref 1–40)
BASOPHILS # BLD AUTO: 0.03 10*3/MM3 (ref 0–0.2)
BASOPHILS NFR BLD AUTO: 0.7 % (ref 0–1.5)
BILIRUB SERPL-MCNC: 0.8 MG/DL (ref 0.2–1.2)
BUN SERPL-MCNC: 11 MG/DL (ref 6–20)
BUN/CREAT SERPL: 11.1 (ref 7–25)
CALCIUM SERPL-MCNC: 9 MG/DL (ref 8.6–10.5)
CHLORIDE SERPL-SCNC: 100 MMOL/L (ref 98–107)
CHOLEST SERPL-MCNC: 146 MG/DL (ref 0–200)
CHOLEST/HDLC SERPL: 4.17 {RATIO}
CO2 SERPL-SCNC: 27.7 MMOL/L (ref 22–29)
CREAT SERPL-MCNC: 0.99 MG/DL (ref 0.76–1.27)
EOSINOPHIL # BLD AUTO: 0.1 10*3/MM3 (ref 0–0.4)
EOSINOPHIL NFR BLD AUTO: 2.3 % (ref 0.3–6.2)
ERYTHROCYTE [DISTWIDTH] IN BLOOD BY AUTOMATED COUNT: 13.2 % (ref 12.3–15.4)
GLOBULIN SER CALC-MCNC: 2.6 GM/DL
GLUCOSE SERPL-MCNC: 86 MG/DL (ref 65–99)
HCT VFR BLD AUTO: 46 % (ref 37.5–51)
HDLC SERPL-MCNC: 35 MG/DL (ref 40–60)
HGB BLD-MCNC: 15.4 G/DL (ref 13–17.7)
IMM GRANULOCYTES # BLD AUTO: 0.03 10*3/MM3 (ref 0–0.05)
IMM GRANULOCYTES NFR BLD AUTO: 0.7 % (ref 0–0.5)
LDLC SERPL CALC-MCNC: 97 MG/DL (ref 0–100)
LYMPHOCYTES # BLD AUTO: 1.23 10*3/MM3 (ref 0.7–3.1)
LYMPHOCYTES NFR BLD AUTO: 28.5 % (ref 19.6–45.3)
MCH RBC QN AUTO: 30.8 PG (ref 26.6–33)
MCHC RBC AUTO-ENTMCNC: 33.5 G/DL (ref 31.5–35.7)
MCV RBC AUTO: 92 FL (ref 79–97)
MONOCYTES # BLD AUTO: 0.51 10*3/MM3 (ref 0.1–0.9)
MONOCYTES NFR BLD AUTO: 11.8 % (ref 5–12)
NEUTROPHILS # BLD AUTO: 2.42 10*3/MM3 (ref 1.7–7)
NEUTROPHILS NFR BLD AUTO: 56 % (ref 42.7–76)
NRBC BLD AUTO-RTO: 0 /100 WBC (ref 0–0.2)
PLATELET # BLD AUTO: 236 10*3/MM3 (ref 140–450)
POTASSIUM SERPL-SCNC: 4.3 MMOL/L (ref 3.5–5.2)
PROT SERPL-MCNC: 7.1 G/DL (ref 6–8.5)
PSA FREE MFR SERPL: NORMAL %
PSA FREE SERPL-MCNC: <0.02 NG/ML
PSA SERPL-MCNC: <0.1 NG/ML (ref 0–4)
RBC # BLD AUTO: 5 10*6/MM3 (ref 4.14–5.8)
SODIUM SERPL-SCNC: 140 MMOL/L (ref 136–145)
TRIGL SERPL-MCNC: 69 MG/DL (ref 0–150)
VLDLC SERPL CALC-MCNC: 13.8 MG/DL (ref 5–40)
WBC # BLD AUTO: 4.32 10*3/MM3 (ref 3.4–10.8)

## 2019-09-25 ENCOUNTER — RESULTS ENCOUNTER (OUTPATIENT)
Dept: INTERNAL MEDICINE | Facility: CLINIC | Age: 59
End: 2019-09-25

## 2019-09-25 DIAGNOSIS — E78.2 MIXED HYPERLIPIDEMIA: ICD-10-CM

## 2019-09-25 DIAGNOSIS — C61 PROSTATE CANCER (HCC): ICD-10-CM

## 2019-09-30 ENCOUNTER — OFFICE VISIT (OUTPATIENT)
Dept: INTERNAL MEDICINE | Facility: CLINIC | Age: 59
End: 2019-09-30

## 2019-09-30 VITALS
HEART RATE: 70 BPM | TEMPERATURE: 97.7 F | SYSTOLIC BLOOD PRESSURE: 124 MMHG | DIASTOLIC BLOOD PRESSURE: 83 MMHG | OXYGEN SATURATION: 99 % | RESPIRATION RATE: 16 BRPM | BODY MASS INDEX: 22.76 KG/M2 | HEIGHT: 70 IN | WEIGHT: 159 LBS

## 2019-09-30 DIAGNOSIS — Z00.00 ANNUAL PHYSICAL EXAM: Primary | ICD-10-CM

## 2019-09-30 DIAGNOSIS — E78.2 MIXED HYPERLIPIDEMIA: ICD-10-CM

## 2019-09-30 DIAGNOSIS — C61 PROSTATE CANCER (HCC): ICD-10-CM

## 2019-09-30 DIAGNOSIS — J30.9 ALLERGIC RHINITIS, UNSPECIFIED SEASONALITY, UNSPECIFIED TRIGGER: ICD-10-CM

## 2019-09-30 DIAGNOSIS — N52.8 OTHER MALE ERECTILE DYSFUNCTION: ICD-10-CM

## 2019-09-30 DIAGNOSIS — Z00.00 HEALTHCARE MAINTENANCE: ICD-10-CM

## 2019-09-30 PROCEDURE — 99396 PREV VISIT EST AGE 40-64: CPT | Performed by: NURSE PRACTITIONER

## 2019-09-30 RX ORDER — MONTELUKAST SODIUM 10 MG/1
10 TABLET ORAL NIGHTLY
Qty: 30 TABLET | Refills: 1 | Status: SHIPPED | OUTPATIENT
Start: 2019-09-30

## 2019-09-30 RX ORDER — AZITHROMYCIN 250 MG/1
TABLET, FILM COATED ORAL
Qty: 6 TABLET | Refills: 0 | Status: SHIPPED | OUTPATIENT
Start: 2019-09-30

## 2019-09-30 NOTE — PROGRESS NOTES
Subjective   Leroy Vega is a 59 y.o. male.   CC: Annual CPE, allergic rhinitis    Patient presents for CPE.  This is a 59-year-old male patient of Dr. haley.  This patient is new to me.  He is a never smoker.  He drinks about 1 alcoholic beverage per day.  He reports that he frequently travels internationally and is set to go out of town again tomorrow.    He had labs drawn on 9/23/2019 which we will review today.    He has a history of hyperlipidemia.  He reports that he has discussed medication for this with Dr. haley in the past but has decided to hold off for now.  His recent lipid panel was normal with the exception of his HDL which was slightly low at 35.  Total cholesterol and LDL cholesterol were normal.    He has a history of prostate cancer.  Recent PSA was within normal limits and he does follow with urology routinely.  He is status post prostatectomy, 2011.    He has a history of ED.  This is stable with Cialis.    He has some upper respiratory congestion today that has been going on for 3 days.  He is going out of town internationally tomorrow and requesting antibiotic in case it persists for longer than 7 days and turns into a bacterial infection.    He denies development of any other new issues today.         The following portions of the patient's history were reviewed and updated as appropriate: allergies, current medications, past family history, past medical history, past social history, past surgical history and problem list.    Review of Systems   Constitutional: Negative for activity change, chills, fatigue, fever, unexpected weight gain and unexpected weight loss.   HENT: Positive for congestion, postnasal drip, rhinorrhea, sinus pressure, sneezing and sore throat. Negative for hearing loss, swollen glands and tinnitus.    Eyes: Negative for photophobia, pain and visual disturbance.   Respiratory: Positive for cough. Negative for chest tightness, shortness of breath and wheezing.   "  Cardiovascular: Negative for chest pain, palpitations and leg swelling.   Gastrointestinal: Negative for abdominal distention, abdominal pain, constipation, diarrhea, nausea and vomiting.   Endocrine: Negative for polydipsia, polyphagia and polyuria.   Genitourinary: Negative for dysuria, frequency, hematuria and urgency.   Neurological: Negative for dizziness, weakness, numbness and headache.   All other systems reviewed and are negative.      Objective    /83 (BP Location: Left arm, Patient Position: Sitting, Cuff Size: Adult)   Pulse 70   Temp 97.7 °F (36.5 °C) (Oral)   Resp 16   Ht 177.8 cm (70\")   Wt 72.1 kg (159 lb)   SpO2 99%   BMI 22.81 kg/m²     Physical Exam   Constitutional: He is oriented to person, place, and time. He appears well-developed and well-nourished. No distress.   HENT:   Head: Normocephalic and atraumatic.   Right Ear: External ear normal.   Left Ear: External ear normal.   Mouth/Throat: Oropharynx is clear and moist.   Eyes: EOM are normal. Pupils are equal, round, and reactive to light.   Neck: Normal range of motion. Neck supple. No JVD present. No tracheal deviation present. No thyromegaly present.   No carotid bruits auscultated   Cardiovascular: Normal rate, regular rhythm, normal heart sounds and intact distal pulses. Exam reveals no gallop and no friction rub.   No murmur heard.  Posterior tib and pedal pulses 2+ and equal bilaterally.  No peripheral edema.   Pulmonary/Chest: Effort normal and breath sounds normal. No stridor. No respiratory distress. He has no wheezes. He has no rales. He exhibits no tenderness.   Lungs are CTA bilaterally   Abdominal: Soft. Bowel sounds are normal. He exhibits no distension. There is no tenderness.   Musculoskeletal: Normal range of motion.   Lymphadenopathy:     He has no cervical adenopathy.   Neurological: He is alert and oriented to person, place, and time.   Skin: Skin is warm and dry. Capillary refill takes less than 2 seconds. " He is not diaphoretic.   Psychiatric: He has a normal mood and affect. His behavior is normal. Judgment and thought content normal.   Nursing note and vitals reviewed.    Current outpatient and discharge medications have been reconciled for the patient.  Reviewed by: NICOLE Allison      Assessment/Plan   Leroy was seen today for annual exam.    Diagnoses and all orders for this visit:    Annual physical exam    Allergic rhinitis, unspecified seasonality, unspecified trigger  -     montelukast (SINGULAIR) 10 MG tablet; Take 1 tablet by mouth Every Night.    Healthcare maintenance    Prostate cancer (CMS/[facility])    Mixed hyperlipidemia    Other male erectile dysfunction    Other orders  -     azithromycin (ZITHROMAX Z-KATHERINE) 250 MG tablet; Take 2 tablets the first day, then 1 tablet daily for 4 days.    -Reviewed patient's labs with him from 9/23/2019.  He refuses a flu shot today.  We did discuss Shingrix and he will pursue this at his pharmacy and is provided with education regarding it.    -Allergic rhinitis: We will provide Singulair today.  He may also use Mucinex over-the-counter.  He is traveling out of the country tomorrow for an extended period of time.  Due to limited access to healthcare and medications in the area of the world that he is traveling to, I will provide azithromycin.  If his congestion/upper respiratory symptoms last for longer than 1 week he may have this on hand to take.    -Prostate cancer: Status post prostatectomy in 2011.  PSA is normal today.    -Hyperlipidemia: Recent lipid panel is within normal limits with exception of HDL which is slightly low at 35.  Discussed exercise with him.  We will recheck routinely.    -ED: Stable with Cialis, continue current therapy.    -Follow-up PRN and we will see him back in 1 year with PCP, Dr. haley for a CPE.

## 2019-09-30 NOTE — PATIENT INSTRUCTIONS
Zoster Vaccine, Recombinant injection  What is this medicine?  ZOSTER VACCINE (ZOS ter vak SEEN) is used to prevent shingles in adults 50 years old and over. This vaccine is not used to treat shingles or nerve pain from shingles.  This medicine may be used for other purposes; ask your health care provider or pharmacist if you have questions.  COMMON BRAND NAME(S): SHINGRIX  What should I tell my health care provider before I take this medicine?  They need to know if you have any of these conditions:  -blood disorders or disease  -cancer like leukemia or lymphoma  -immune system problems or therapy  -an unusual or allergic reaction to vaccines, other medications, foods, dyes, or preservatives  -pregnant or trying to get pregnant  -breast-feeding  How should I use this medicine?  This vaccine is for injection in a muscle. It is given by a health care professional.  Talk to your pediatrician regarding the use of this medicine in children. This medicine is not approved for use in children.  Overdosage: If you think you have taken too much of this medicine contact a poison control center or emergency room at once.  NOTE: This medicine is only for you. Do not share this medicine with others.  What if I miss a dose?  Keep appointments for follow-up (booster) doses as directed. It is important not to miss your dose. Call your doctor or health care professional if you are unable to keep an appointment.  What may interact with this medicine?  -medicines that suppress your immune system  -medicines to treat cancer  -steroid medicines like prednisone or cortisone  This list may not describe all possible interactions. Give your health care provider a list of all the medicines, herbs, non-prescription drugs, or dietary supplements you use. Also tell them if you smoke, drink alcohol, or use illegal drugs. Some items may interact with your medicine.  What should I watch for while using this medicine?  Visit your doctor for regular  check ups.  This vaccine, like all vaccines, may not fully protect everyone.  What side effects may I notice from receiving this medicine?  Side effects that you should report to your doctor or health care professional as soon as possible:  -allergic reactions like skin rash, itching or hives, swelling of the face, lips, or tongue  -breathing problems  Side effects that usually do not require medical attention (report these to your doctor or health care professional if they continue or are bothersome):  -chills  -headache  -fever  -nausea, vomiting  -redness, warmth, pain, swelling or itching at site where injected  -tiredness  This list may not describe all possible side effects. Call your doctor for medical advice about side effects. You may report side effects to FDA at 1-389-AZQ-7532.  Where should I keep my medicine?  This vaccine is only given in a clinic, pharmacy, doctor's office, or other health care setting and will not be stored at home.  NOTE: This sheet is a summary. It may not cover all possible information. If you have questions about this medicine, talk to your doctor, pharmacist, or health care provider.  © 2019 [employer] (2018-07-30 13:20:30)

## 2020-08-17 ENCOUNTER — OFFICE VISIT (OUTPATIENT)
Dept: SPORTS MEDICINE | Facility: CLINIC | Age: 60
End: 2020-08-17

## 2020-08-17 VITALS
HEART RATE: 48 BPM | OXYGEN SATURATION: 99 % | WEIGHT: 160 LBS | BODY MASS INDEX: 22.9 KG/M2 | DIASTOLIC BLOOD PRESSURE: 78 MMHG | SYSTOLIC BLOOD PRESSURE: 128 MMHG | HEIGHT: 70 IN | TEMPERATURE: 97.3 F

## 2020-08-17 DIAGNOSIS — S69.92XA LEFT WRIST INJURY, INITIAL ENCOUNTER: Primary | ICD-10-CM

## 2020-08-17 PROCEDURE — 73110 X-RAY EXAM OF WRIST: CPT | Performed by: FAMILY MEDICINE

## 2020-08-17 PROCEDURE — 99214 OFFICE O/P EST MOD 30 MIN: CPT | Performed by: FAMILY MEDICINE
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
## 2020-08-26 ENCOUNTER — HOSPITAL ENCOUNTER (OUTPATIENT)
Dept: MRI IMAGING | Facility: HOSPITAL | Age: 60
Discharge: HOME | End: 2020-08-26
Admitting: FAMILY MEDICINE

## 2020-08-26 DIAGNOSIS — S69.92XA LEFT WRIST INJURY, INITIAL ENCOUNTER: ICD-10-CM

## 2020-08-26 PROCEDURE — 73221 MRI JOINT UPR EXTREM W/O DYE: CPT

## 2020-08-28 ENCOUNTER — OFFICE VISIT (OUTPATIENT)
Dept: SPORTS MEDICINE | Facility: CLINIC | Age: 60
End: 2020-08-28

## 2020-08-28 DIAGNOSIS — M67.432 GANGLION CYST OF VOLAR ASPECT OF LEFT WRIST: Primary | ICD-10-CM

## 2020-08-28 PROCEDURE — 99441 PR PHYS/QHP TELEPHONE EVALUATION 5-10 MIN: CPT | Performed by: FAMILY MEDICINE

## 2020-08-28 NOTE — PROGRESS NOTES
Telephone Visit Note    CC: Follow-up on left wrist pain, discuss MRI results    History of Present Illness  Cock up wrist pain has been helpful.  He has been able to modify activity which has been also helpful.    MRI left wrist without contrast reviewed with patient.  There is a large ganglion cyst near the capitate and lunocapitate joint that measures 18 x 12 x 5 mm and does extend down toward the extensor tendon bundle.  Bone contusion seen.  Degenerative changes within the wrist.      Diagnoses and all orders for this visit:    Ganglion cyst of volar aspect of left wrist      Ganglion cyst likely acute related to his trauma.  Discussed possibility of in office aspiration under ultrasound.  Patient will discuss further with his wife and call back to schedule if interested.    This patient was evaluated by telephone due to current precautions with COVID-19.  Consent to telephone visit for this problem was given by the patient. I spent a total of 9 minutes on the phone with the patient.

## 2020-08-31 ENCOUNTER — TELEPHONE (OUTPATIENT)
Dept: SPORTS MEDICINE | Facility: CLINIC | Age: 60
End: 2020-08-31

## 2020-08-31 NOTE — TELEPHONE ENCOUNTER
Patient called in, wants to schedule an appointment to have a ganglion cyst on the LT wrist drained. Is this OK to go ahead and schedule?? States it wasn't discussed very much at his last office visit.     Thanks,  Charlene

## 2020-09-02 ENCOUNTER — OFFICE VISIT (OUTPATIENT)
Dept: SPORTS MEDICINE | Facility: CLINIC | Age: 60
End: 2020-09-02

## 2020-09-02 VITALS
DIASTOLIC BLOOD PRESSURE: 72 MMHG | BODY MASS INDEX: 22.9 KG/M2 | OXYGEN SATURATION: 98 % | HEART RATE: 78 BPM | HEIGHT: 70 IN | WEIGHT: 160 LBS | SYSTOLIC BLOOD PRESSURE: 120 MMHG | TEMPERATURE: 97.3 F

## 2020-09-02 DIAGNOSIS — M67.432 GANGLION CYST OF VOLAR ASPECT OF LEFT WRIST: Primary | ICD-10-CM

## 2020-09-02 PROCEDURE — 20550 NJX 1 TENDON SHEATH/LIGAMENT: CPT | Performed by: FAMILY MEDICINE

## 2020-09-02 RX ORDER — TRIAMCINOLONE ACETONIDE 40 MG/ML
20 INJECTION, SUSPENSION INTRA-ARTICULAR; INTRAMUSCULAR ONCE
Status: COMPLETED | OUTPATIENT
Start: 2020-09-02 | End: 2020-09-02

## 2020-09-02 RX ADMIN — TRIAMCINOLONE ACETONIDE 20 MG: 40 INJECTION, SUSPENSION INTRA-ARTICULAR; INTRAMUSCULAR at 10:22

## 2020-09-02 NOTE — PROGRESS NOTES
Procedure: Ganglion cyst aspiration and injection    Risk, benefits and alternatives were discussed with patient.  Area was identified on the left wrist with physical examination and then confirmed with ultrasound.  Ultrasound was used throughout the procedure for accuracy.  Area was prepped proximal to the ulnar styloid in sterile fashion.  Betadine, alcohol swabs were utilized.  Ethyl chloride was used for topical anesthesia and then a 25-gauge, 1.5 inch needle was advanced to administer anesthesia of 1% lidocaine without epinephrine, approximately 1 cc.  This needle was removed.  Subsequently, an 18-gauge, 1.5 inch needle was then advanced under continuous ultrasound visualization to the cyst deep to the extensor compartment.  No cyst fluid was aspirated unfortunately.  Syringe was exchanged for injectate.  Injectate was administered without difficulty.  Needle was removed.  Band-Aid was applied.    Injectate: 40 mg/ML triamcinolone acetonide: 0.5 mL    Leroy was seen today for ganglion cyst.    Diagnoses and all orders for this visit:    Ganglion cyst of volar aspect of left wrist  -     triamcinolone acetonide (KENALOG-40) injection 20 mg      Discussed with Anshu that unfortunately we were not able to aspirate cystic fluid.  Hopeful that the cortisone injection will help to shrink the cyst and if he continues to have problems 4 weeks out, to come back for follow-up or consider surgical consultation.

## 2021-03-22 ENCOUNTER — BULK ORDERING (OUTPATIENT)
Dept: CASE MANAGEMENT | Facility: OTHER | Age: 61
End: 2021-03-22

## 2021-03-22 DIAGNOSIS — Z23 IMMUNIZATION DUE: ICD-10-CM

## 2024-05-30 ENCOUNTER — TELEPHONE (OUTPATIENT)
Dept: INTERNAL MEDICINE | Facility: CLINIC | Age: 64
End: 2024-05-30
Payer: COMMERCIAL

## 2024-05-30 NOTE — TELEPHONE ENCOUNTER
Lvm for patient that dr haley is not taking new patients.  Patient can call back and schedule with NP or new MD.    Hello, I moved back to [place] this month and need to re-establish a relationship with a PCP for an annual exam. I don't know if Dr Bonner is still practicing or not. If he's not and you can recommend another physician (preferably someone who is oriented toward sports fitness) that would be awesome as I still run/race, cycle, swim and kayak

## 2024-08-20 ENCOUNTER — OFFICE VISIT (OUTPATIENT)
Dept: INTERNAL MEDICINE | Facility: CLINIC | Age: 64
End: 2024-08-20
Payer: COMMERCIAL

## 2024-08-20 ENCOUNTER — TELEPHONE (OUTPATIENT)
Dept: INTERNAL MEDICINE | Facility: CLINIC | Age: 64
End: 2024-08-20

## 2024-08-20 VITALS
RESPIRATION RATE: 18 BRPM | OXYGEN SATURATION: 98 % | HEART RATE: 51 BPM | TEMPERATURE: 97.7 F | WEIGHT: 162.8 LBS | SYSTOLIC BLOOD PRESSURE: 122 MMHG | BODY MASS INDEX: 23.31 KG/M2 | DIASTOLIC BLOOD PRESSURE: 80 MMHG | HEIGHT: 70 IN

## 2024-08-20 DIAGNOSIS — Z11.59 NEED FOR HEPATITIS C SCREENING TEST: ICD-10-CM

## 2024-08-20 DIAGNOSIS — Z71.84 COUNSELING FOR TRAVEL: ICD-10-CM

## 2024-08-20 DIAGNOSIS — E78.2 MIXED HYPERLIPIDEMIA: Primary | ICD-10-CM

## 2024-08-20 PROCEDURE — 99203 OFFICE O/P NEW LOW 30 MIN: CPT | Performed by: STUDENT IN AN ORGANIZED HEALTH CARE EDUCATION/TRAINING PROGRAM

## 2024-08-20 RX ORDER — ATOVAQUONE AND PROGUANIL HYDROCHLORIDE 250; 100 MG/1; MG/1
1 TABLET, FILM COATED ORAL
Qty: 20 TABLET | Refills: 0 | Status: SHIPPED | OUTPATIENT
Start: 2024-08-24 | End: 2024-09-13

## 2024-08-20 RX ORDER — ATOVAQUONE AND PROGUANIL HYDROCHLORIDE 250; 100 MG/1; MG/1
1 TABLET, FILM COATED ORAL
Qty: 20 TABLET | Refills: 0 | Status: SHIPPED | OUTPATIENT
Start: 2024-08-24 | End: 2024-08-20

## 2024-08-20 NOTE — TELEPHONE ENCOUNTER
Caller: Leroy Vega    Relationship: Self    Best call back number: [phone]     What medication are you requesting: MALARONE OR DOXYCYCLINE    What are your current symptoms:     How long have you been experiencing symptoms:     Have you had these symptoms before:    [] Yes  [x] No    Have you been treated for these symptoms before:   [x] Yes  [] No    If a prescription is needed, what is your preferred pharmacy and phone number: [facility] [id number] - [place] [address] AT [facility] [address] [facility] - [phone] [facility] [phone] FX     Additional notes:  PATIENT IS TRAVELING TO [place] AND HAS REQUESTED AN ANTI-MALARIA MEDICATION. PATIENT PREFERS MALARONE. HAS DISCUSSED WITH PCP

## 2024-08-20 NOTE — PROGRESS NOTES
"Chief Complaint  Establish Care and Hyperlipidemia    Subjective        Leroy Vega presents to [facility]  History of Present Illness  64-year-old male here to establish care with a history of previously treated prostate cancer with prostatectomy in 2011 and hyperlipidemia.  He has no concerns today and recently back moved back to [place] from [place], and wanted to establish care with a new PCP.    He maintains an active lifestyle with cycling, running and swimming.  Denies significant joint pain with this.  Reports chronic neck issues but no new concerns.  Plans to reestablish with urology as well as dermatology.  Intermittently travels for work, which may necessitate malarial prophylaxis depending on the area.  He has an upcoming trip to [place] next week.  He has trialed many different malarial prophylaxis agents including chloroquine, doxycycline and atovaquone-proguanil all of which he has tolerated.  He has had the yellow fever vaccine as well.            Objective   Vital Signs:  /80 (BP Location: Left arm, Patient Position: Sitting, Cuff Size: Adult)   Pulse 51   Temp 97.7 °F (36.5 °C) (Oral)   Resp 18   Ht 177.8 cm (70\")   Wt 73.8 kg (162 lb 12.8 oz)   SpO2 98%   BMI 23.36 kg/m²   Estimated body mass index is 23.36 kg/m² as calculated from the following:    Height as of this encounter: 177.8 cm (70\").    Weight as of this encounter: 73.8 kg (162 lb 12.8 oz).          Physical Exam  Vitals and nursing note reviewed.   Constitutional:       General: He is not in acute distress.     Appearance: Normal appearance.   Cardiovascular:      Rate and Rhythm: Normal rate and regular rhythm.   Pulmonary:      Effort: Pulmonary effort is normal.      Breath sounds: Normal breath sounds.   Skin:     General: Skin is warm and dry.   Neurological:      Mental Status: He is alert and oriented to person, place, and time.   Psychiatric:         Mood and Affect: Mood " normal.         Judgment: Judgment normal.        Result Review :                Assessment and Plan   Diagnoses and all orders for this visit:    1. Mixed hyperlipidemia (Primary)  Comments:  Review labs to be done prior to next visit and discuss at annual physical next month  Orders:  -     TSH Rfx On Abnormal To Free T4; Future  -     CBC & Differential; Future  -     Lipid Panel With / Chol / HDL Ratio; Future  -     Hemoglobin A1c; Future  -     Comprehensive Metabolic Panel; Future    2. Need for hepatitis C screening test  Comments:  Discussed with patient one-time screening for hepatitis C, as this can cause long-term liver issues, and he is agreeable  Orders:  -     Hepatitis C Antibody; Future    3. Counseling for travel    Other orders  -     Cancel: Hepatitis C Antibody; Future  -     atovaquone-proguanil (MALARONE) 250-100 MG tablet per tablet; Take 1 tablet by mouth Daily for 20 days. Start 1 to 2 days before trip malaria endemic region, and continue daily for 7 days after leaving malaria endemic region  Indications: Malaria  Dispense: 20 tablet; Refill: 0      Reviewed CDC travel recommendations for [place] and discussed this with patient.  He reports having up-to-date Tdap as well as hepatitis vaccines in the past.  No need for checking on chikungunya vaccine.  He has no other questions at this time regarding upcoming trip.       Follow Up   Return in about 4 weeks (around 9/17/2024).  Patient was given instructions and counseling regarding his condition or for health maintenance advice. Please see specific information pulled into the AVS if appropriate.

## 2024-08-20 NOTE — PATIENT INSTRUCTIONS
Travel Vaccine Information  Vaccines (immunizations) can protect you from certain diseases. If you plan to travel to another country, see your health care provider or a travel medicine specialist to discuss:  Where you are going. Include all countries in your travel schedule.  How long you are staying.  What you will be doing.  Based on this information, your health care provider may recommend:  Routine vaccines. These vaccines are standard for all children and adults.  Travel vaccines:  For most travelers. These vaccines are recommended for most travelers before foreign travel.  For some travelers. These vaccines may be necessary based on the destination country or region.  It is important to see your health care provider at least 4-6 weeks before you travel, and bring your vaccine records. This allows time for recommended vaccines to take effect. It also provides enough time for you to get vaccines that must be given in a series over a period of days or weeks. If you have fewer than 4 weeks before you leave, you should still see your health care provider. You might still benefit from vaccines or medicines.  What are routine vaccines?  Routine vaccines are shots that can protect you from common diseases in many parts of the world. Most routine vaccines are given at certain ages starting in childhood. It is important that you are up to date on your routine vaccines before you travel. Routine vaccines include:  An annual flu (influenza) vaccine. The annual influenza vaccine sometimes differs for the northern and southern hemispheres. You should:  Get both vaccines if you are traveling to the other hemisphere and you have a chronic medical condition.  Get the vaccine shortly before or during the flu season, and only if the vaccine in your country differs from the vaccine in your destination country.  Get the other influenza vaccine either before leaving the country or shortly after arriving at the destination  country.  Age-related vaccines.  Infants 6-11 months old should receive a measles, mumps, and rubella (MMR) vaccine before traveling to another country.  Children and adults should be up to date with all recommended vaccines.  Adults 60 years or older should talk to their health care provider about getting a vaccine against a certain type of pneumonia (pneumococcal) and a vaccine against shingles (herpes zoster).  Extra doses of certain vaccines (booster vaccines), such as Tdap (tetanus, diphtheria, and pertussis).  What are recommended vaccines?  Recommended travel vaccines change over time. Your health care provider can tell you what vaccines are recommended before your trip. Recommended vaccines will depend on:  The country or countries of travel.  Whether you will be traveling to rural areas.  How long you will be traveling.  The season of the year.  Your health status.  Your vaccine history.  For most travelers  The following vaccines are recommended for most international travelers, depending on the country or countries you are traveling to:  Hepatitis A.  Typhoid.  For some travelers  Additional vaccines may be required when traveling to certain countries, due to a disease being common in a particular area or an ongoing outbreak of a disease. The following vaccines may be recommended based on where you are traveling:  Yellow fever vaccine. This is required before traveling to certain countries in [place] and [place].  Get the yellow fever vaccine at an approved center at least 10 days before your trip.  You will receive a stamp, certificate, or other proof of yellow fever immunization.  If proof of immunization is incomplete or inaccurate, you could be medically isolated (quarantined), denied entry, or given another dose of vaccine at the travel site.  If it has been longer than 10 years since you received the yellow fever vaccine, another dose is required.  Meningococcal vaccine. This may be required  prior to travel to parts of [place] and [place].  Get this vaccine at least 10 days before your trip. After 10 days, most people show immunity to meningococcal disease.  Proof of meningococcal immunization is required by the [place] [employer] for any person taking part in a [employer] pilgrimage. You may not receive a visa if you are not able to provide proof of immunization.  If it has been longer than 3 years since your last immunization, another dose may be required.  Polio vaccine. If you travel to a country where there is a higher risk of getting polio, you may need a booster dose.  Polio is a routine vaccine that most people receive as a child. Even if you completed the vaccine series as a child, you may need a booster dose before traveling to high-risk countries.  Infants and children may need to follow an accelerated schedule to complete the polio vaccine series before traveling to high-risk countries.  Some countries may require you to show proof that you have been vaccinated.  Depending on your travel plans, you may need additional vaccines, such as:  Hepatitis B.  Rabies.  Tick-borne encephalitis.  Cholera.  Where to find more information  Centers for Disease Control and Prevention (CDC): www.cdc.gov  World Health Organization (WHO): www.who.int  U.[place]. Department of Health and Human Services: www.vaccines.gov  Summary  Vaccines (immunizations) can protect you from certain diseases.  See your health care provider at least 4-6 weeks before you travel, and bring your vaccine records. This allows time for vaccines to take effect.  Vaccines for travelers include routine vaccines, recommended travel vaccines, and geographically required travel vaccines.  The most commonly recommended travel vaccines are the hepatitis A and typhoid vaccines.  This information is not intended to replace advice given to you by your health care provider. Make sure you discuss any questions you have with your health  care provider.  Document Revised: 02/07/2022 Document Reviewed: 02/07/2022  [employer] Patient Education © 2024 Elsevier Inc.            Atovaquone; Proguanil Tablets  What is this medication?  ATOVAQUONE; PROGUANIL (a TOE va kwone; pro GWAN il) prevents and treats malaria. It works by killing the parasite that causes malaria. It will not treat colds, the flu, or infections caused by bacteria or viruses.  This medicine may be used for other purposes; ask your health care provider or pharmacist if you have questions.  COMMON BRAND NAME(S): Malarone, Malarone Pediatric  What should I tell my care team before I take this medication?  They need to know if you have any of these conditions:  Kidney disease  Liver disease  Stomach problems  An unusual or allergic reaction to atovaquone, proguanil, other medications, foods, dyes, or preservatives  Pregnant or trying to get pregnant  Breast-feeding  How should I use this medication?  Take this medication by mouth with water. Take it as directed on the prescription label at the same time every day. Take it with food or a milky drink. Swallow the tablets whole. You may crush the tablet and mix with condensed milk. Swallow the medication and condensed milk right away. Take all of this medication unless your care team tells you to stop it early. Keep taking it even if you think you are better.  If you vomit within 1 hour after taking your dose, take your dose again.  Talk to your care team about the use of this medication in children. While it may be prescribed for children for selected conditions, precautions do apply.  Overdosage: If you think you have taken too much of this medicine contact a poison control center or emergency room at once.  NOTE: This medicine is only for you. Do not share this medicine with others.  What if I miss a dose?  If you miss a dose, take it as soon as you can. If it is almost time for your next dose, take only that dose. Do not take double or extra  doses.  What may interact with this medication?  Certain medications that prevent or treat blood clots, such as warfarin  Metoclopramide  Rifabutin  Rifampin  Tetracycline  This list may not describe all possible interactions. Give your health care provider a list of all the medicines, herbs, non-prescription drugs, or dietary supplements you use. Also tell them if you smoke, drink alcohol, or use illegal drugs. Some items may interact with your medicine.  What should I watch for while using this medication?  Visit your care team for regular checks on your progress. Tell your care team if your symptoms do not start to get better or if they get worse.  This medication can make you more sensitive to the sun. Keep out of the sun. If you cannot avoid being in the sun, wear protective clothing and sunscreen. Do not use sun lamps or tanning beds/booths.  This medication may cause serious skin reactions. They can happen weeks to months after starting the medication. Contact your care team right away if you notice fevers or flu-like symptoms with a rash. The rash may be red or purple and then turn into blisters or peeling of the skin. You may also notice a red rash with swelling of the face, lips, or lymph nodes in your neck or under your arms.  What side effects may I notice from receiving this medication?  Side effects that you should report to your care team as soon as possible:  Allergic reactions--skin rash, itching, hives, swelling of the face, lips, tongue, or throat  Liver injury--right upper belly pain, loss of appetite, nausea, light-colored stool, dark yellow or brown urine, yellowing skin or eyes, unusual weakness or fatigue  Side effects that usually do not require medical attention (report to your care team if they continue or are bothersome):  Cough  Diarrhea  Headache  Nausea  Stomach pain  Vivid dreams or nightmares  Vomiting  This list may not describe all possible side effects. Call your doctor for  medical advice about side effects. You may report side effects to FDA at 2-488-LPF-3447.  Where should I keep my medication?  Keep out of the reach of children and pets.  Store between 15 and 30 degrees C (59 and 86 degrees F). Get rid of any unused medication after the expiration date.  To get rid of medications that are no longer needed or have :  Take the medication to a medication take-back program. Check with your pharmacy or law enforcement to find a location.  If you cannot return the medication, check the label or package insert to see if the medication should be thrown out in the garbage or flushed down the toilet. If you are not sure, ask your care team. If it is safe to put it in the trash, empty the medication out of the container. Mix the medication with cat litter, dirt, coffee grounds, or other unwanted substance. Seal the mixture in a bag or container. Put it in the trash.  NOTE: This sheet is a summary. It may not cover all possible information. If you have questions about this medicine, talk to your doctor, pharmacist, or health care provider.  ©  Elsevier/[employer] (2023 00:00:00)

## 2024-09-12 ENCOUNTER — TELEPHONE (OUTPATIENT)
Dept: INTERNAL MEDICINE | Facility: CLINIC | Age: 64
End: 2024-09-12
Payer: COMMERCIAL

## 2024-09-12 NOTE — TELEPHONE ENCOUNTER
----- Message from Charisma Montez sent at 9/12/2024  9:04 AM EDT -----  Good morning Mr. Vega,    Hope you had a good trip to [place].  Your blood work came back and overall looks great.  There is no evidence of anemia; blood counts are normal.  Thyroid function is normal.  There is no evidence of diabetes.  Electrolytes, kidney function and liver function are normal.  Screening for hepatitis C, commended as a once lifetime screening, is normal.     LDL cholesterol, or bad cholesterol, is somewhat elevated (similar to what this has been in the past).  Based on 10-year risk of heart attack or stroke (noted below) your risk is determined to be intermediate.  In this setting, it is unclear if cholesterol medication is needed.  We can go over options to further evaluate your risk at your upcoming appointment few days if you would like.    The 10-year ASCVD risk score (Malena PACHECO, et al., 2019) is: 10.1%    Values used to calculate the score:      Age: 64 years      Sex: Male      Is Non- : No      Diabetic: No      Tobacco smoker: No      Systolic Blood Pressure: 122 mmHg      Is BP treated: No      HDL Cholesterol: 56 mg/dL      Total Cholesterol: 198 mg/dL     Please let me know if you have any other concerns.  Take care,  Dr. Montez

## 2024-09-12 NOTE — TELEPHONE ENCOUNTER
LVM for pt to check [employer] for results of labs.  Call with questions or concerns  Viktor FAITH

## 2024-09-16 ENCOUNTER — OFFICE VISIT (OUTPATIENT)
Dept: INTERNAL MEDICINE | Facility: CLINIC | Age: 64
End: 2024-09-16
Payer: COMMERCIAL

## 2024-09-16 DIAGNOSIS — Z00.00 ENCOUNTER FOR ANNUAL PHYSICAL EXAM: Primary | ICD-10-CM

## 2024-09-16 PROCEDURE — 99396 PREV VISIT EST AGE 40-64: CPT | Performed by: STUDENT IN AN ORGANIZED HEALTH CARE EDUCATION/TRAINING PROGRAM
